# Patient Record
Sex: FEMALE | Race: BLACK OR AFRICAN AMERICAN | NOT HISPANIC OR LATINO | URBAN - METROPOLITAN AREA
[De-identification: names, ages, dates, MRNs, and addresses within clinical notes are randomized per-mention and may not be internally consistent; named-entity substitution may affect disease eponyms.]

---

## 2023-06-07 ENCOUNTER — EMERGENCY (EMERGENCY)
Facility: HOSPITAL | Age: 23
LOS: 1 days | Discharge: HOME | End: 2023-06-07
Attending: EMERGENCY MEDICINE
Payer: COMMERCIAL

## 2023-06-07 VITALS
RESPIRATION RATE: 20 BRPM | HEIGHT: 65 IN | OXYGEN SATURATION: 100 % | SYSTOLIC BLOOD PRESSURE: 128 MMHG | TEMPERATURE: 98 F | WEIGHT: 138.01 LBS | DIASTOLIC BLOOD PRESSURE: 67 MMHG | HEART RATE: 65 BPM

## 2023-06-07 PROCEDURE — 93005 ELECTROCARDIOGRAM TRACING: CPT

## 2023-06-07 PROCEDURE — 71046 X-RAY EXAM CHEST 2 VIEWS: CPT

## 2023-06-07 PROCEDURE — 93010 ELECTROCARDIOGRAM REPORT: CPT

## 2023-06-07 PROCEDURE — 99284 EMERGENCY DEPT VISIT MOD MDM: CPT

## 2023-06-07 PROCEDURE — 99284 EMERGENCY DEPT VISIT MOD MDM: CPT | Mod: 25

## 2023-06-07 PROCEDURE — 94640 AIRWAY INHALATION TREATMENT: CPT

## 2023-06-07 PROCEDURE — 71046 X-RAY EXAM CHEST 2 VIEWS: CPT | Mod: 26

## 2023-06-07 RX ORDER — ALBUTEROL 90 UG/1
2 AEROSOL, METERED ORAL
Qty: 1 | Refills: 0
Start: 2023-06-07

## 2023-06-07 RX ORDER — IPRATROPIUM/ALBUTEROL SULFATE 18-103MCG
3 AEROSOL WITH ADAPTER (GRAM) INHALATION ONCE
Refills: 0 | Status: COMPLETED | OUTPATIENT
Start: 2023-06-07 | End: 2023-06-07

## 2023-06-07 RX ORDER — IBUPROFEN 200 MG
600 TABLET ORAL ONCE
Refills: 0 | Status: COMPLETED | OUTPATIENT
Start: 2023-06-07 | End: 2023-06-07

## 2023-06-07 RX ORDER — IBUPROFEN 200 MG
1 TABLET ORAL
Qty: 16 | Refills: 0
Start: 2023-06-07

## 2023-06-07 RX ADMIN — Medication 50 MILLIGRAM(S): at 19:42

## 2023-06-07 RX ADMIN — Medication 600 MILLIGRAM(S): at 19:41

## 2023-06-07 RX ADMIN — Medication 3 MILLILITER(S): at 19:42

## 2023-06-07 NOTE — ED PROVIDER NOTE - CLINICAL SUMMARY MEDICAL DECISION MAKING FREE TEXT BOX
22 y/o F with chest tightness and SOB since this afternoon, vitals stable no hypoxia, +b/l wheeze and chest wall tenderness, will give duoneb, ibuprofen prednisone, obtain CXR and reassess 24 y/o F with chest tightness and SOB since this afternoon, vitals stable no hypoxia, +b/l wheeze and chest wall tenderness, will give duoneb, ibuprofen prednisone, obtain CXR and reassess    CXR clear , lungs cta after neb tx. likely ractive airway and MSK chest pain given chest wall tenderness. will dc with albuterol steroid analgesia and f/uPCP, discussed return precautions

## 2023-06-07 NOTE — ED PROVIDER NOTE - PHYSICAL EXAMINATION
Gen: No acute distress, non toxic  HEENT: Mucous membranes moist, pink conjunctivae, EOMI  CV: RRR, nl s1/s2. +Chest wall tenderness  Resp: B/L wheezing, normal rate and effort  GI: Abdomen soft, NT, ND. No rebound, no guarding  : No CVAT  Neuro: A&O x 3, moving all 4 extremities  MSK: No spine or joint tenderness to palpation  Skin: No rashes. intact and perfused.

## 2023-06-07 NOTE — ED ADULT NURSE NOTE - NSFALLUNIVINTERV_ED_ALL_ED
Bed/Stretcher in lowest position, wheels locked, appropriate side rails in place/Call bell, personal items and telephone in reach/Instruct patient to call for assistance before getting out of bed/chair/stretcher/Non-slip footwear applied when patient is off stretcher/[place] to call system/Physically safe environment - no spills, clutter or unnecessary equipment/Purposeful proactive rounding/Room/bathroom lighting operational, light cord in reach

## 2023-06-07 NOTE — ED PROVIDER NOTE - NSFOLLOWUPINSTRUCTIONS_ED_ALL_ED_FT
Please take medications as directed  Please follow up with primary care doctor in 2-3 days  Return to ER for any new or worsening symptoms

## 2023-06-07 NOTE — ED ADULT NURSE NOTE - OBJECTIVE STATEMENT
22 yo F p/w c/o SOB and CP. A&Ox4 and amb. 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 Pain on isnpiration. Resp. equal and unlabored b/l. VSS. NAD. Comfort measures provided, safety measures implemented, call bell in reach. MD at bedside.

## 2023-06-07 NOTE — ED PROVIDER NOTE - ATTENDING APP SHARED VISIT CONTRIBUTION OF CARE
The patient discussed with PA    Chest Pain    I, Burke Dobson, performed the initial face to face bedside interview with this patient regarding history of present illness, review of symptoms and relevant past medical, social and family history.  I completed an independent physical examination.  I was the initial provider who evaluated this patient. I have signed out the follow up of any pending tests (i.e. labs, radiological studies) to the ACP.  I have communicated the patient’s plan of care and disposition with the ACP.
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
## 2023-06-07 NOTE — ED ADULT TRIAGE NOTE - CHIEF COMPLAINT QUOTE
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 gasping when taking off oxygen, 100 on room air.

## 2023-06-07 NOTE — ED PROVIDER NOTE - PATIENT PORTAL LINK FT
You can access the FollowMyHealth Patient Portal offered by [facility] by registering at the following website: [email or web address]. By joining [facility]’s FollowMyHealth portal, you will also be able to view your health information using other applications (apps) compatible with our system.

## 2024-04-15 ENCOUNTER — EMERGENCY (EMERGENCY)
Facility: HOSPITAL | Age: 24
LOS: 1 days | Discharge: HOME | End: 2024-04-15
Attending: EMERGENCY MEDICINE
Payer: COMMERCIAL

## 2024-04-15 VITALS
DIASTOLIC BLOOD PRESSURE: 77 MMHG | HEIGHT: 65 IN | TEMPERATURE: 98 F | WEIGHT: 119.93 LBS | RESPIRATION RATE: 19 BRPM | SYSTOLIC BLOOD PRESSURE: 123 MMHG | HEART RATE: 77 BPM | OXYGEN SATURATION: 95 %

## 2024-04-15 DIAGNOSIS — M54.50 LOW BACK PAIN, UNSPECIFIED: ICD-10-CM

## 2024-04-15 DIAGNOSIS — S52.591A OTHER FRACTURES OF LOWER END OF RIGHT RADIUS, INITIAL ENCOUNTER FOR CLOSED FRACTURE: ICD-10-CM
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
## 2024-04-15 PROBLEM — Z78.9 OTHER SPECIFIED HEALTH STATUS: Chronic | Status: ACTIVE | Noted: 2023-06-07

## 2024-04-15 PROCEDURE — 73030 X-RAY EXAM OF SHOULDER: CPT | Mod: 26,RT

## 2024-04-15 PROCEDURE — 73110 X-RAY EXAM OF WRIST: CPT | Mod: 26,RT

## 2024-04-15 PROCEDURE — 73130 X-RAY EXAM OF HAND: CPT | Mod: 26,RT

## 2024-04-15 PROCEDURE — 99284 EMERGENCY DEPT VISIT MOD MDM: CPT

## 2024-04-15 PROCEDURE — 25600 CLTX DST RDL FX/EPHYS SEP WO: CPT | Mod: 54,RT

## 2024-04-15 PROCEDURE — 73562 X-RAY EXAM OF KNEE 3: CPT

## 2024-04-15 PROCEDURE — 99284 EMERGENCY DEPT VISIT MOD MDM: CPT | Mod: 57

## 2024-04-15 PROCEDURE — 72100 X-RAY EXAM L-S SPINE 2/3 VWS: CPT

## 2024-04-15 PROCEDURE — 73130 X-RAY EXAM OF HAND: CPT

## 2024-04-15 PROCEDURE — 73562 X-RAY EXAM OF KNEE 3: CPT | Mod: 26,RT

## 2024-04-15 PROCEDURE — 73030 X-RAY EXAM OF SHOULDER: CPT

## 2024-04-15 PROCEDURE — 73080 X-RAY EXAM OF ELBOW: CPT

## 2024-04-15 PROCEDURE — 29125 APPL SHORT ARM SPLINT STATIC: CPT | Mod: RT

## 2024-04-15 PROCEDURE — 73080 X-RAY EXAM OF ELBOW: CPT | Mod: 26,RT

## 2024-04-15 PROCEDURE — 73110 X-RAY EXAM OF WRIST: CPT

## 2024-04-15 PROCEDURE — 72100 X-RAY EXAM L-S SPINE 2/3 VWS: CPT | Mod: 26

## 2024-04-15 RX ORDER — METHOCARBAMOL 500 MG/1
1 TABLET, FILM COATED ORAL
Qty: 21 | Refills: 0
Start: 2024-04-15 | End: 2024-04-21

## 2024-04-15 RX ORDER — IBUPROFEN 200 MG
600 TABLET ORAL ONCE
Refills: 0 | Status: COMPLETED | OUTPATIENT
Start: 2024-04-15 | End: 2024-04-15

## 2024-04-15 RX ORDER — METHOCARBAMOL 500 MG/1
1500 TABLET, FILM COATED ORAL ONCE
Refills: 0 | Status: COMPLETED | OUTPATIENT
Start: 2024-04-15 | End: 2024-04-15

## 2024-04-15 RX ORDER — IBUPROFEN 200 MG
1 TABLET ORAL
Qty: 28 | Refills: 0
Start: 2024-04-15 | End: 2024-04-21

## 2024-04-15 RX ADMIN — METHOCARBAMOL 1500 MILLIGRAM(S): 500 TABLET, FILM COATED ORAL at 12:57

## 2024-04-15 RX ADMIN — Medication 600 MILLIGRAM(S): at 13:58

## 2024-04-15 RX ADMIN — Medication 600 MILLIGRAM(S): at 12:57
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
## 2024-04-15 NOTE — ED PROVIDER NOTE - PATIENT PORTAL LINK FT
You can access the FollowMyHealth Patient Portal offered by [facility] by registering at the following website: [email or web address]. By joining [facility]’s FollowMyHealth portal, you will also be able to view your health information using other applications (apps) compatible with our system.

## 2024-04-15 NOTE — ED PROVIDER NOTE - CARE PROVIDER_API CALL
Toni Moseley  Plastic Surgery  [address] [place] [id number]  Phone: [phone]  Fax: [phone]  Follow Up Time: 1-3 Days

## 2024-04-15 NOTE — ED PROVIDER NOTE - PHYSICAL EXAMINATION
R Wrist/arm Exam  Sensation: sensation intact to light touch in first dorsalis web space, 5th/3rd finger volar tip.  Motor: right: Unable to assess 2/2 pain.  ROM: right: Unable to assess 2/2 pain.  Skin/Inspection: No erythema, +abrasion on dorsal hand, no bruises, +swelling on the dorsal radium area. Inspection: +displacement of the distal wrist os appreciated. No snuff box tenderness.  Vascular: CRT<2sec in all digits.    Const: Awake, alert and oriented. In no acute distress. Well appearing.  HEENT: NC/AT. Moist mucous membranes.  Eyes: No scleral icterus. EOMI.  Neck:. Soft and supple. Full ROM without pain.  Cardiac: Regular rate and rhythm. +S1/S2. No murmurs. Peripheral pulses 2+ and symmetric. No LE edema.  Resp: Speaking in full sentences. No evidence of respiratory distress. No wheezes, rales or rhonchi.  Abd: Soft, non-tender, non-distended. Normal bowel sounds in all 4 quadrants. No guarding or rebound.  Back: Spine midline and non-tender. No CVAT.  Musc: R knee ttp. No bruise or deformities. R elbow TTP. R shoulder TTP.  Neuro: no gross deficits, moving all extremities, normal speech
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
## 2024-04-15 NOTE — ED PROVIDER NOTE - NSFOLLOWUPINSTRUCTIONS_ED_ALL_ED_FT
Please follow-up with your hand doctor/ primary care doctor.  Please call for an appointment in the next 48 hours but if you cannot follow-up with your doctor please return to the Emergency Department for any urgent issues.    You were given a copy of the tests performed today.  Please bring the results with you and review them with your doctor.    If you have any worsening of symptoms or any other concerns please return to the Emergency Department immediately.    Please continue taking your home medications as directed.    ---  Radial Fracture  Bones of the arm and hand. There is a break, or fracture, in the radius.  A radial fracture is a break in the radius bone. The radius is a bone in the forearm, on the same side as the thumb. The forearm is the part of the arm that is between the elbow and the wrist. A radial fracture near the wrist (distal radialfracture) is the most common type of broken arm. A fracture can also occur near the elbow (radial head fracture).    What are the causes?  The most common cause of a radial fracture is falling with the arm outstretched. Other causes include:  An accident, such as a car or bike accident.  A hard, direct hit to the forearm.  What increases the risk?  You may be at greater risk for a radial fracture if you:  Are female.  Are an older adult.  Play contact sports.  Have a condition that causes your bones to become thin and brittle (osteoporosis).  What are the signs or symptoms?  A radial fracture causes pain immediately after the injury. Other signs and symptoms may include:  An abnormal bend or bump in the arm (deformity).  Swelling.  Tenderness.  Bruising.  Numbness or tingling in your arm and hand.  Limited movement of your arm and hand.  Pain when trying to move your wrist, hand, or elbow.  How is this diagnosed?  This condition may be diagnosed based on:  Your symptoms and medical history.  A physical exam.  An X-ray.  How is this treated?  Treatment depends on how severe your fracture is, where it is, and how the pieces of the broken bone line up with each other (alignment).  Initially, you may need to wear a temporary splint to stabilize the injury for a few days until your swelling goes down. After the swelling goes down, you may get a cast, get a different type of splint, or have surgery.  If your broken bone is not aligned (displaced) or significantly involves other joints (intra-articular fracture), your health care provider will need to align the bone pieces. To align your broken bone, your health care provider may:  Move the bones back into position without surgery (closed reduction).  Perform surgery to align the fracture and fix the bone pieces into place with metal screws, plates, or wires (open reduction and internal fixation).  Perform surgery to align the fracture and fix the bone pieces into place with pins that are attached to a stabilizing bar outside your skin (external fixation).  If there is a cut (laceration) in the skin over the fracture, this may indicate a compound fracture. You may need to take antibiotic medicines and have surgery to clean out the wound and prevent infection of the bones.  Treatment may also include:  Wearing a splint or cast. This keeps your wrist in place (immobilizes) and allows the fractured bone to heal properly.  Having your cast changed after 2–3 weeks.  Physical therapy exercises to improve movement and strength in your arm.  Follow-up visits and X-rays to make sure you are healing.  Follow these instructions at home:  If you have a removable splint:    Wear the splint as told by your health care provider. Remove it only as told by your health care provider.  Check the skin around the splint every day. Tell your health care provider about any concerns.  Loosen the splint if your fingers tingle, become numb, or turn cold and blue.  Keep the splint clean and dry.  If you have a nonremovable cast or splint:    Do not put pressure on any part of the cast or splint until it is fully hardened. This may take several hours.  Do not stick anything inside the cast or splint to scratch your skin. Doing that increases your risk of infection.  Check the skin around the cast or splint every day. Tell your health care provider about any concerns.  You may put lotion on dry skin around the edges of the cast or splint. Do not put lotion on the skin underneath the cast or splint.  Keep it clean and dry.  Bathing    Do not take baths, swim, or use a hot tub until your health care provider approves. Ask your health care provider if you may take showers. You may only be allowed to take sponge baths.  If your splint or cast is not waterproof:  Do not let it get wet.  Cover it with a watertight covering when you take a bath or a shower.  Managing pain, stiffness, and swelling    Bag of ice on a towel on the skin.   If directed, put ice on the painful area. To do this:  If you have a removable splint, remove it as told by your health care provider.  Put ice in a plastic bag.  Place a towel between your skin and the bag, or between your cast or splint and the bag.  Leave the ice on for 20 minutes, 2–3 times a day.  Remove the ice if your skin turns bright red. This is very important. If you cannot feel pain, heat, or cold, you have a greater risk of damage to the area.  Move your fingers often to reduce stiffness and swelling.  Raise (elevate) your arm above the level of your heart while you are sitting or lying down.  Activity    Do not lift anything with your injured arm.  Do not use the injured arm to support your body weight until your health care provider says that you can.  Ask your health care provider what activities are safe for you and what activities you should avoid while you heal.  Do exercises as told by your health care provider or physical therapist.  Driving    Ask your health care provider:  If the medicine prescribed to you requires you to avoid driving or using machinery.  When it is safe to drive if you have a splint or cast on your arm.  General instructions    Take over-the-counter and prescription medicines only as told by your health care provider.  If you were prescribed an antibiotic medicine, take it as told by your health care provider. Do not stop using the antibiotic even if you start to feel better.  Do not use any products that contain nicotine or tobacco. These products include cigarettes, chewing tobacco, and vaping devices, such as e-cigarettes. These can delay bone healing. If you need help quitting, ask your health care provider.  Keep all follow-up visits. This is important.  Contact a health care provider if you have:  Pain that does not get better with medicine.  Swelling that gets worse.  A bad smell coming from your cast.  Get help right away if:  You cannot move your fingers.  You have severe pain, especially if the pain changes significantly or suddenly.  Your fingers or your hand:  Become numb, cold, or pale.  Turn a bluish color.  Summary  A radial fracture is a break in the radius bone.  The most common cause is falling on an outstretched hand. Treatment depends on how severe your fracture is, where it is, and how the pieces of the broken bone line up with each other.  A splint or cast may be needed to help the fracture heal. A more severe fracture may require surgery.  This information is not intended to replace advice given to you by your health care provider. Make sure you discuss any questions you have with your health care provider.    ---  Wrist Splint or Brace, Adult  A wrist splint or brace holds your wrist in position so it does not move. A splint or brace provides support for the wrist, but a brace is less stiff than a splint and is often used for a longer time. You can take off a splint or brace or make it loose. You may need a wrist splint or brace if you hurt your wrist or have swelling in your wrist.    What are the risks?  Reduced blood flow. This can happen if the splint or brace is too tight or if you have a lot of swelling. Lack of blood flow can cause a condition called compartment syndrome. Symptoms of this condition include:  Pain in your wrist that gets worse.  Tingling.  Having no feeling in your wrist or hand (numbness).  Pale or blue skin.  Cold fingers.  There are other risks, such as:  A stiff wrist.  A weak wrist.  Itching, rash, sores, or infection.  How to wear your wrist splint or brace  Your splint or brace should be tight enough to support your wrist. But it should not be too tight because it can block the flow of blood to your wrist. Your doctor will tell you how to wear your splint or brace and how long to wear it.  Wear the splint or brace as told by your doctor. Only take it off as told by your doctor.  Loosen the splint or brace if your fingers tingle, get numb, or turn cold and blue.  Do not stick anything inside the splint or brace to scratch your skin.  Check the skin around the splint or brace every day. Tell your doctor if you see problems.  Keep the splint or brace clean.  If the splint or brace is not waterproof:  Do not let it get wet.  Cover it with a watertight covering when you take a bath or a shower.  General recommendations  Do not put pressure on any part of the splint until it is fully hardened.  Clean your splint or brace regularly. Make sure it is dry before you put it on. To clean and care for your splint or brace:  Follow directions from your doctor.  Read the cleaning instructions that came with the splint or brace.  Follow these instructions at home:  Managing pain, stiffness, and swelling      If told, put ice on the injured area.  If you a have a splint or brace that can be taken off, take it off as told by your doctor.  Put ice in a plastic bag.  Place a towel between your skin and the bag.  Leave the ice on for 20 minutes, 2–3 times a day.  Take off the ice if your skin turns bright red. This is very important. If you cannot feel pain, heat, or cold, you have a greater risk of damage to the area.  Move your fingers often to avoid stiffness and to lessen swelling.  Raise the injured area above the level of your heart while you are sitting or lying down.  Activity    Do exercises as told by your doctor.  Ask your doctor when it is safe to drive with a splint or brace on your wrist.  Return to your normal activities as told by your doctor. Ask your doctor what activities are safe for you.  General instructions    Do not use the injured hand to do heavy work, such as lifting, pulling or pushing.  Do not smoke or use any products that contain nicotine or tobacco. If you need help quitting, ask your doctor.  Take over-the-counter and prescription medicines only as told by your doctor.  Keep all follow-up visits.  Get help if:  You have wrist pain or swelling that does not go away.  The skin around or under your splint or brace gets red, itchy, or moist.  You have chills or fever.  Your splint or brace feels too tight or too loose.  Your splint or brace breaks.  Get help right away if:  You have pain that gets worse.  You have tingling and numbness.  You have changes in skin color, including paleness or a bluish color.  Your fingers are cold.  Summary  A wrist splint or brace is a device that supports your wrist and keeps it from moving.  Wear your splint or brace as told by your doctor. This helps your wrist to heal correctly.  Use ice on your wrist. Also, move your fingers often and raise your wrist above the level of your heart when you sit or lie down.  Your splint or brace should be tight enough to support your wrist. Do not make it too tight.  Get help right away if your fingers tingle, get numb, or turn cold and blue. Loosen the splint or brace right away.  This information is not intended to replace advice given to you by your health care provider. Make sure you discuss any questions you have with your health care provider.

## 2024-07-17 ENCOUNTER — NON-APPOINTMENT (OUTPATIENT)
Age: 24
End: 2024-07-17

## 2024-07-18 ENCOUNTER — APPOINTMENT (OUTPATIENT)
Dept: OBGYN | Facility: CLINIC | Age: 24
End: 2024-07-18
Payer: COMMERCIAL

## 2024-07-18 VITALS
BODY MASS INDEX: 19.99 KG/M2 | WEIGHT: 120 LBS | HEIGHT: 65 IN | DIASTOLIC BLOOD PRESSURE: 70 MMHG | SYSTOLIC BLOOD PRESSURE: 110 MMHG

## 2024-07-18 DIAGNOSIS — F12.90 CANNABIS USE, UNSPECIFIED, UNCOMPLICATED: ICD-10-CM

## 2024-07-18 DIAGNOSIS — Z01.411 ENCOUNTER FOR GYNECOLOGICAL EXAMINATION (GENERAL) (ROUTINE) WITH ABNORMAL FINDINGS: ICD-10-CM

## 2024-07-18 DIAGNOSIS — Z78.9 OTHER SPECIFIED HEALTH STATUS: ICD-10-CM

## 2024-07-18 DIAGNOSIS — Z87.09 PERSONAL HISTORY OF OTHER DISEASES OF THE RESPIRATORY SYSTEM: ICD-10-CM

## 2024-07-18 DIAGNOSIS — J45.909 UNSPECIFIED ASTHMA, UNCOMPLICATED: ICD-10-CM

## 2024-07-18 DIAGNOSIS — Z86.2 PERSONAL HISTORY OF DISEASES OF THE BLOOD AND BLOOD-FORMING ORGANS AND CERTAIN DISORDERS INVOLVING THE IMMUNE MECHANISM: ICD-10-CM

## 2024-07-18 DIAGNOSIS — N94.6 DYSMENORRHEA, UNSPECIFIED: ICD-10-CM

## 2024-07-18 DIAGNOSIS — D64.9 ANEMIA, UNSPECIFIED: ICD-10-CM

## 2024-07-18 PROBLEM — Z00.00 ENCOUNTER FOR PREVENTIVE HEALTH EXAMINATION: Status: ACTIVE | Noted: 2024-07-18

## 2024-07-18 PROCEDURE — 99385 PREV VISIT NEW AGE 18-39: CPT

## 2024-07-18 PROCEDURE — 99459 PELVIC EXAMINATION: CPT

## 2024-07-19 LAB
C TRACH RRNA SPEC QL NAA+PROBE: NOT DETECTED
HPV HIGH+LOW RISK DNA PNL CVX: NOT DETECTED
N GONORRHOEA RRNA SPEC QL NAA+PROBE: NOT DETECTED
SOURCE TP AMPLIFICATION: NORMAL

## 2024-07-23 ENCOUNTER — APPOINTMENT (OUTPATIENT)
Dept: ULTRASOUND IMAGING | Facility: CLINIC | Age: 24
End: 2024-07-23

## 2024-07-24 LAB — CYTOLOGY CVX/VAG DOC THIN PREP: NORMAL

## 2024-08-08 ENCOUNTER — APPOINTMENT (OUTPATIENT)
Dept: OBGYN | Facility: CLINIC | Age: 24
End: 2024-08-08

## 2024-10-22 ENCOUNTER — NON-APPOINTMENT (OUTPATIENT)
Age: 24
End: 2024-10-22

## 2024-10-24 ENCOUNTER — NON-APPOINTMENT (OUTPATIENT)
Age: 24
End: 2024-10-24

## 2025-01-17 ENCOUNTER — APPOINTMENT (OUTPATIENT)
Dept: OBGYN | Facility: CLINIC | Age: 25
End: 2025-01-17

## 2025-01-31 ENCOUNTER — APPOINTMENT (OUTPATIENT)
Dept: OBGYN | Facility: CLINIC | Age: 25
End: 2025-01-31

## 2025-07-30 ENCOUNTER — NON-APPOINTMENT (OUTPATIENT)
Age: 25
End: 2025-07-30